# Patient Record
Sex: MALE | Race: BLACK OR AFRICAN AMERICAN | NOT HISPANIC OR LATINO
[De-identification: names, ages, dates, MRNs, and addresses within clinical notes are randomized per-mention and may not be internally consistent; named-entity substitution may affect disease eponyms.]

---

## 2023-06-08 PROBLEM — Z00.00 ENCOUNTER FOR PREVENTIVE HEALTH EXAMINATION: Status: ACTIVE | Noted: 2023-06-08

## 2023-06-13 ENCOUNTER — APPOINTMENT (OUTPATIENT)
Dept: ORTHOPEDIC SURGERY | Facility: CLINIC | Age: 43
End: 2023-06-13
Payer: OTHER MISCELLANEOUS

## 2023-06-13 VITALS — HEIGHT: 66 IN | BODY MASS INDEX: 36.64 KG/M2 | WEIGHT: 228 LBS

## 2023-06-13 DIAGNOSIS — S93.491A SPRAIN OF OTHER LIGAMENT OF RIGHT ANKLE, INITIAL ENCOUNTER: ICD-10-CM

## 2023-06-13 DIAGNOSIS — I10 ESSENTIAL (PRIMARY) HYPERTENSION: ICD-10-CM

## 2023-06-13 PROCEDURE — 73610 X-RAY EXAM OF ANKLE: CPT | Mod: RT

## 2023-06-13 PROCEDURE — L4361: CPT | Mod: RT

## 2023-06-13 PROCEDURE — 99203 OFFICE O/P NEW LOW 30 MIN: CPT

## 2023-06-13 NOTE — PHYSICAL EXAM
[Right] : right foot and ankle [Moderate] : moderate swelling of lateral ankle [5___] : plantar flexion 5[unfilled]/5 [2+] : dorsalis pedis pulse: 2+ [] : ambulation with crutches [de-identified] : plantar flexion 20 degrees [TWNoteComboBox7] : dorsiflexion 5 degrees

## 2023-06-13 NOTE — HISTORY OF PRESENT ILLNESS
[9] : 9 [3] : 3 [Dull/Aching] : dull/aching [Sharp] : sharp [Throbbing] : throbbing [Not working due to injury] : Work status: not working due to injury [de-identified] : 06/13/2023:  inversion injury at work 6/7 w/ ankle pain. went to ed for xrays. currently using crutches. no prior ankle probs. denies dm/tob.  -- not working since injury [Work related] : work related [] : no [FreeTextEntry1] : right ankle [FreeTextEntry3] : 6/7/23 [de-identified] : xray, crutches [de-identified] : ED [de-identified] : xray [de-identified] : [employer]

## 2023-06-13 NOTE — WORK
[Sprain/Strain] : sprain/strain [Was the competent medical cause of the injury] : was the competent medical cause of the injury [Are consistent with the injury] : are consistent with the injury [Consistent with my objective findings] : consistent with my objective findings [Total (100%)] : total (100%) [Does not reveal pre-existing condition(s) that may affect treatment/prognosis] : does not reveal pre-existing condition(s) that may affect treatment/prognosis [Other: ___] : [unfilled] [Cannot return to work because ________] : cannot return to work because [unfilled] [N/A] : : Not Applicable [Patient] : patient [No Rx restrictions] : No Rx restrictions. [I provided the services listed above] :  I provided the services listed above. [FreeTextEntry1] : good

## 2023-06-13 NOTE — ASSESSMENT
[FreeTextEntry1] : [facility]\par cam boot\par ice/elevate\par nsaids prn\par out from work\par f/up 2 wks

## 2023-06-27 ENCOUNTER — APPOINTMENT (OUTPATIENT)
Dept: ORTHOPEDIC SURGERY | Facility: CLINIC | Age: 43
End: 2023-06-27
Payer: OTHER MISCELLANEOUS

## 2023-06-27 VITALS — BODY MASS INDEX: 36.64 KG/M2 | WEIGHT: 228 LBS | HEIGHT: 66 IN

## 2023-06-27 PROCEDURE — L4350: CPT | Mod: RT

## 2023-06-27 PROCEDURE — 99213 OFFICE O/P EST LOW 20 MIN: CPT

## 2023-06-27 NOTE — PHYSICAL EXAM
[Right] : right foot and ankle [5___] : plantar flexion 5[unfilled]/5 [2+] : dorsalis pedis pulse: 2+ [] : ambulation with cane [FreeTextEntry8] : improved [TWNoteComboBox7] : dorsiflexion 10 degrees [de-identified] : plantar flexion 30 degrees

## 2023-06-27 NOTE — ASSESSMENT
[FreeTextEntry1] : [facility] [employer]\par PT\par ice/elevate\par nsaids prn\par out from work\par f/up 4 wks

## 2023-06-27 NOTE — HISTORY OF PRESENT ILLNESS
[Work related] : work related [4] : 4 [0] : 0 [Dull/Aching] : dull/aching [Sharp] : sharp [Throbbing] : throbbing [Not working due to injury] : Work status: not working due to injury [de-identified] : 06/13/2023:  inversion injury at work 6/7 w/ ankle pain. went to ed for xrays. currently using crutches. no prior ankle probs. denies dm/tob.  -- not working since injury\par \par 06/27/2023: improving. walking in boot w/ cane.not working [] : Post Surgical Visit: no [FreeTextEntry1] : right ankle [FreeTextEntry3] : 6/7/23 [de-identified] : boot and cane  [de-identified] : [facility]  [de-identified] : xray [de-identified] : [employer]

## 2023-07-21 ENCOUNTER — FORM ENCOUNTER (OUTPATIENT)
Age: 43
End: 2023-07-21

## 2023-07-24 ENCOUNTER — APPOINTMENT (OUTPATIENT)
Dept: ORTHOPEDIC SURGERY | Facility: CLINIC | Age: 43
End: 2023-07-24
Payer: OTHER MISCELLANEOUS

## 2023-07-24 VITALS — WEIGHT: 228 LBS | HEIGHT: 66 IN | BODY MASS INDEX: 36.64 KG/M2

## 2023-07-24 PROCEDURE — 99213 OFFICE O/P EST LOW 20 MIN: CPT

## 2023-07-24 NOTE — PHYSICAL EXAM
[Right] : right foot and ankle [2+] : dorsalis pedis pulse: 2+ [NL (40)] : plantar flexion 40 degrees [NL 30)] : inversion 30 degrees [NL (20)] : eversion 20 degrees [5___] : eversion 5[unfilled]/5 [] : patient ambulates without assistive device [FreeTextEntry8] : improved [de-identified] : plantar flexion 30 degrees [TWNoteComboBox7] : dorsiflexion 10 degrees

## 2023-07-24 NOTE — ASSESSMENT
[FreeTextEntry1] : [facility] [employer]\par PT\par ice/elevate\par nsaids prn\par out from work\par reeval 3 wks for return to work

## 2023-07-24 NOTE — HISTORY OF PRESENT ILLNESS
[Work related] : work related [3] : 3 [0] : 0 [Dull/Aching] : dull/aching [Sharp] : sharp [Throbbing] : throbbing [Intermittent] : intermittent [Not working due to injury] : Work status: not working due to injury [de-identified] : 06/13/2023:  inversion injury at work 6/7 w/ ankle pain. went to ed for xrays. currently using crutches. no prior ankle probs. denies dm/tob.  -- not working since injury\par \par 06/27/2023: improving. walking in boot w/ cane.not working\par \par 07/24/2023: pain improving. walking in regular shoes w/ brace. going to PT. out of work.  [] : Post Surgical Visit: no [FreeTextEntry1] : right ankle [FreeTextEntry3] : 6/7/23 [de-identified] : [place]  [de-identified] : [facility]  [de-identified] : xray [de-identified] : [employer]

## 2023-08-14 ENCOUNTER — APPOINTMENT (OUTPATIENT)
Dept: ORTHOPEDIC SURGERY | Facility: CLINIC | Age: 43
End: 2023-08-14
Payer: OTHER MISCELLANEOUS

## 2023-08-14 VITALS — WEIGHT: 228 LBS | BODY MASS INDEX: 36.64 KG/M2 | HEIGHT: 66 IN

## 2023-08-14 PROCEDURE — 99213 OFFICE O/P EST LOW 20 MIN: CPT

## 2023-08-14 NOTE — HISTORY OF PRESENT ILLNESS
[Work related] : work related [3] : 3 [0] : 0 [Dull/Aching] : dull/aching [Sharp] : sharp [Throbbing] : throbbing [Intermittent] : intermittent [Not working due to injury] : Work status: not working due to injury [de-identified] : 06/13/2023:  inversion injury at work 6/7 w/ ankle pain. went to ed for xrays. currently using crutches. no prior ankle probs. denies dm/tob.  -- not working since injury  06/27/2023: improving. walking in boot w/ cane.not working  07/24/2023: pain improving. walking in regular shoes w/ brace. going to PT. out of work.   08/14/2023: improving. walking in regular shoes w/ brace. going to PT. out of work. Continued difficulty with walking for >2 hours and with prolonged driving. Associated AM stiffness.  [] : Post Surgical Visit: no [FreeTextEntry1] : right ankle [FreeTextEntry3] : 6/7/23 [de-identified] : airport  [de-identified] : [facility]  [de-identified] : xray [de-identified] : [employer]

## 2023-08-14 NOTE — PHYSICAL EXAM
[Right] : right foot and ankle [NL (40)] : plantar flexion 40 degrees [NL 30)] : inversion 30 degrees [NL (20)] : eversion 20 degrees [5___] : eversion 5[unfilled]/5 [2+] : dorsalis pedis pulse: 2+ [] : no achilles tendon tenderness [FreeTextEntry8] : improved [TWNoteComboBox7] : dorsiflexion 10 degrees

## 2023-08-14 NOTE — ASSESSMENT
[FreeTextEntry1] : [facility] [employer] PT ice/elevate nsaids prn out from work reeval 3 wks for return to work

## 2023-09-11 ENCOUNTER — APPOINTMENT (OUTPATIENT)
Dept: ORTHOPEDIC SURGERY | Facility: CLINIC | Age: 43
End: 2023-09-11
Payer: OTHER MISCELLANEOUS

## 2023-09-11 PROCEDURE — 99214 OFFICE O/P EST MOD 30 MIN: CPT

## 2023-09-13 ENCOUNTER — APPOINTMENT (OUTPATIENT)
Dept: MRI IMAGING | Facility: CLINIC | Age: 43
End: 2023-09-13
Payer: OTHER MISCELLANEOUS

## 2023-09-13 PROCEDURE — 73721 MRI JNT OF LWR EXTRE W/O DYE: CPT | Mod: RT

## 2023-09-18 ENCOUNTER — APPOINTMENT (OUTPATIENT)
Dept: ORTHOPEDIC SURGERY | Facility: CLINIC | Age: 43
End: 2023-09-18
Payer: OTHER MISCELLANEOUS

## 2023-09-18 ENCOUNTER — NON-APPOINTMENT (OUTPATIENT)
Age: 43
End: 2023-09-18

## 2023-09-18 PROCEDURE — 99214 OFFICE O/P EST MOD 30 MIN: CPT

## 2023-10-02 ENCOUNTER — APPOINTMENT (OUTPATIENT)
Dept: ORTHOPEDIC SURGERY | Facility: CLINIC | Age: 43
End: 2023-10-02
Payer: OTHER MISCELLANEOUS

## 2023-10-02 PROCEDURE — 99213 OFFICE O/P EST LOW 20 MIN: CPT

## 2023-11-13 ENCOUNTER — APPOINTMENT (OUTPATIENT)
Dept: ORTHOPEDIC SURGERY | Facility: CLINIC | Age: 43
End: 2023-11-13

## 2024-04-01 ENCOUNTER — APPOINTMENT (OUTPATIENT)
Dept: GASTROENTEROLOGY | Facility: CLINIC | Age: 44
End: 2024-04-01

## 2024-05-21 ENCOUNTER — APPOINTMENT (OUTPATIENT)
Dept: ORTHOPEDIC SURGERY | Facility: CLINIC | Age: 44
End: 2024-05-21
Payer: OTHER MISCELLANEOUS

## 2024-05-21 ENCOUNTER — APPOINTMENT (OUTPATIENT)
Dept: ORTHOPEDIC SURGERY | Facility: CLINIC | Age: 44
End: 2024-05-21

## 2024-05-21 VITALS — WEIGHT: 228 LBS | HEIGHT: 66 IN | BODY MASS INDEX: 36.64 KG/M2

## 2024-05-21 DIAGNOSIS — S93.491D SPRAIN OF OTHER LIGAMENT OF RIGHT ANKLE, SUBSEQUENT ENCOUNTER: ICD-10-CM

## 2024-05-21 PROCEDURE — 99455 WORK RELATED DISABILITY EXAM: CPT

## 2024-10-09 ENCOUNTER — APPOINTMENT (OUTPATIENT)
Dept: GASTROENTEROLOGY | Facility: CLINIC | Age: 44
End: 2024-10-09
Payer: COMMERCIAL

## 2024-10-09 VITALS
HEIGHT: 66 IN | SYSTOLIC BLOOD PRESSURE: 117 MMHG | WEIGHT: 235 LBS | BODY MASS INDEX: 37.77 KG/M2 | OXYGEN SATURATION: 96 % | DIASTOLIC BLOOD PRESSURE: 72 MMHG | HEART RATE: 111 BPM

## 2024-10-09 DIAGNOSIS — K20.90 ESOPHAGITIS, UNSPECIFIED WITHOUT BLEEDING: ICD-10-CM

## 2024-10-09 DIAGNOSIS — Z12.11 ENCOUNTER FOR SCREENING FOR MALIGNANT NEOPLASM OF COLON: ICD-10-CM

## 2024-10-09 PROCEDURE — 99214 OFFICE O/P EST MOD 30 MIN: CPT

## 2024-10-09 RX ORDER — SODIUM SULFATE, POTASSIUM SULFATE AND MAGNESIUM SULFATE 1.6; 3.13; 17.5 G/177ML; G/177ML; G/177ML
17.5-3.13-1.6 SOLUTION ORAL
Qty: 177 | Refills: 0 | Status: ACTIVE | COMMUNITY
Start: 2024-10-09 | End: 1900-01-01

## 2024-11-13 ENCOUNTER — APPOINTMENT (OUTPATIENT)
Dept: GASTROENTEROLOGY | Facility: GI CENTER | Age: 44
End: 2024-11-13
Payer: COMMERCIAL

## 2024-11-13 ENCOUNTER — RESULT REVIEW (OUTPATIENT)
Age: 44
End: 2024-11-13

## 2024-11-13 PROCEDURE — 43239 EGD BIOPSY SINGLE/MULTIPLE: CPT

## 2024-11-13 PROCEDURE — 45380 COLONOSCOPY AND BIOPSY: CPT

## 2024-12-21 ENCOUNTER — APPOINTMENT (OUTPATIENT)
Dept: GASTROENTEROLOGY | Facility: CLINIC | Age: 44
End: 2024-12-21